# Patient Record
Sex: FEMALE | Race: WHITE | Employment: STUDENT | URBAN - METROPOLITAN AREA
[De-identification: names, ages, dates, MRNs, and addresses within clinical notes are randomized per-mention and may not be internally consistent; named-entity substitution may affect disease eponyms.]

---
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
## 2020-05-05 ENCOUNTER — TELEMEDICINE (OUTPATIENT)
Dept: INTERNAL MEDICINE CLINIC | Age: 20
End: 2020-05-05
Payer: COMMERCIAL

## 2020-05-05 ENCOUNTER — TELEPHONE (OUTPATIENT)
Dept: INTERNAL MEDICINE CLINIC | Age: 20
End: 2020-05-05

## 2020-05-05 PROBLEM — L70.9 ACNE: Status: ACTIVE | Noted: 2020-05-05

## 2020-05-05 PROBLEM — D16.9 OSTEOCHONDROMA: Status: ACTIVE | Noted: 2017-07-21

## 2020-05-05 PROBLEM — N92.0 MENORRHAGIA: Status: ACTIVE | Noted: 2017-04-11

## 2020-05-05 PROBLEM — F32.A DEPRESSIVE DISORDER: Status: ACTIVE | Noted: 2020-05-05

## 2020-05-05 PROBLEM — H52.6 DISORDER OF REFRACTION AND ACCOMMODATION: Status: ACTIVE | Noted: 2020-05-05

## 2020-05-05 PROCEDURE — 99212 OFFICE O/P EST SF 10 MIN: CPT | Performed by: INTERNAL MEDICINE

## 2020-05-05 ASSESSMENT — ENCOUNTER SYMPTOMS
VOMITING: 0
DIARRHEA: 0
BACK PAIN: 0
ABDOMINAL DISTENTION: 0
WHEEZING: 0
CONSTIPATION: 0
CHEST TIGHTNESS: 0
COUGH: 0
NAUSEA: 0
SHORTNESS OF BREATH: 0

## 2020-05-05 NOTE — PROGRESS NOTES
2020    TELEHEALTH EVALUATION -- Audio/Visual (During GNTLI-15 public health emergency)    HPI:    Jayesh Carter (:  2000) has requested an audio/video evaluation for the following concern(s):    Dysthymia and IBS    Mood improved on venlafaxine. Pt is interested in weaning off of medication as she feels her sxs are improved. Mood, energy and motivation ok. Denies si    IBS sxs improved with nortriptyline. Followed by GI    Review of Systems   Constitutional: Negative for unexpected weight change. Respiratory: Negative for cough, chest tightness, shortness of breath and wheezing. Cardiovascular: Negative for chest pain, palpitations and leg swelling. Gastrointestinal: Negative for abdominal distention, constipation, diarrhea, nausea and vomiting. Musculoskeletal: Negative for arthralgias, back pain and myalgias. Neurological: Negative for tremors and numbness. Psychiatric/Behavioral: Negative for dysphoric mood, self-injury, sleep disturbance and suicidal ideas. The patient is not nervous/anxious. All other systems reviewed and are negative. Prior to Visit Medications    Medication Sig Taking? Authorizing Provider   nortriptyline (PAMELOR) 25 MG capsule TAKE 1 CAPSULE BY MOUTH EVERY NIGHT Yes Pierre Paris MD   norgestimate-ethinyl estradiol ([address] [id number]) 0.25-35 MG-MCG per tablet TAKE 1 TABLET BY MOUTH EVERY DAY Yes Beverly Carmichael MD   venlafaxine (EFFEXOR) 37.5 MG tablet TAKE 1 TABLET BY MOUTH EVERY DAY Yes Beverly Carmichael MD   polyethylene glycol (MIRALAX) POWD powder Take 238 g by mouth daily Take as directed for colonoscopy Yes Pierre Paris MD       Social History     Tobacco Use    Smoking status: Never Smoker    Smokeless tobacco: Never Used   Substance Use Topics    Alcohol use: Yes     Comment: 7/wk    Drug use: No        No Known Allergies, History reviewed. No pertinent past medical history. ,   Social History     Tobacco Use    Smoking status: Never Smoker  Smokeless tobacco: Never Used   Substance Use Topics    Alcohol use: Yes     Comment: 7/wk    Drug use: No       PHYSICAL EXAMINATION:  [ INSTRUCTIONS:  \"[x]\" Indicates a positive item  \"[]\" Indicates a negative item  -- DELETE ALL ITEMS NOT EXAMINED]  Vital Signs: (As obtained by patient/caregiver or practitioner observation)    Constitutional: [x] Appears well-developed and well-nourished [x] No apparent distress      [] Abnormal-   Mental status  [x] Alert and awake  [x] Oriented to person/place/time []Able to follow commands      Eyes:  EOM    [x]  Normal  [] Abnormal-  Sclera  [x]  Normal  [] Abnormal -         Discharge []  None visible  [] Abnormal -    HENT:   [x] Normocephalic, atraumatic. [] Abnormal   [x] Mouth/Throat: Mucous membranes are moist.     External Ears [x] Normal  [] Abnormal-     Neck: [x] No visualized mass     Pulmonary/Chest: [x] Respiratory effort normal.  [x] No visualized signs of difficulty breathing or respiratory distress        [] Abnormal-      Musculoskeletal:         [x] Normal range of motion of neck        [] Abnormal-       Neurological:        [x] No Facial Asymmetry (Cranial nerve 7 motor function) (limited exam to video visit)          [x] No gaze palsy        [] Abnormal-   Psychiatric:       [x] Normal Affect [x] No Hallucinations         [] Abnormal-   Giuliana Medina was seen today for medication check. Diagnoses and all orders for this visit:    Dysthymia  Improved. Discussed risks of possible return of sxs with weaning off medication. Will cut tablets in half and take 1/2 daily x 30 days. Will update me on her sxs. Will call to discuss further weaning in one month    Irritable bowel syndrome with diarrhea  Improved. Continue nortriptyline        Corrine Hassan is a 21 y.o. female being evaluated by a Virtual Visit (video visit) encounter to address concerns as mentioned above. A caregiver was present when appropriate.  Due to this being a TeleHealth encounter (During MEZAB-18 public health emergency), evaluation of the following organ systems was limited: Vitals/Constitutional/EENT/Resp/CV/GI//MS/Neuro/Skin/Heme-Lymph-Imm. Pursuant to the emergency declaration under the [facility], [address] [place] [address] authority and the [employer] Act, this Virtual Visit was conducted with patient's (and/or legal guardian's) consent, to reduce the patient's risk of exposure to COVID-19 and provide necessary medical care. The patient (and/or legal guardian) has also been advised to contact this office for worsening conditions or problems, and seek emergency medical treatment and/or call 911 if deemed necessary. Patient identification was verified at the start of the visit: Yes    Total time spent on this encounter: 5-10 minutes    Services were provided through a video synchronous discussion virtually to substitute for in-person clinic visit. Patient and provider were located at their individual homes. --Piero Calvert MD on 5/5/2020 at 2:22 PM    An electronic signature was used to authenticate this note.

## 2020-07-30 RX ORDER — NORTRIPTYLINE HYDROCHLORIDE 25 MG/1
CAPSULE ORAL
Qty: 90 CAPSULE | Refills: 0 | OUTPATIENT
Start: 2020-07-30

## 2020-08-06 RX ORDER — NORTRIPTYLINE HYDROCHLORIDE 25 MG/1
CAPSULE ORAL
Qty: 90 CAPSULE | Refills: 0 | OUTPATIENT
Start: 2020-08-06

## 2020-08-06 NOTE — TELEPHONE ENCOUNTER
Patient called and states she is out of her medication and has contacted [facility] multiple times to have them send a refill request.

## 2020-08-07 RX ORDER — NORTRIPTYLINE HYDROCHLORIDE 25 MG/1
CAPSULE ORAL
Qty: 90 CAPSULE | Refills: 0 | Status: SHIPPED | OUTPATIENT
Start: 2020-08-07 | End: 2020-08-17 | Stop reason: SDUPTHER

## 2020-08-17 ENCOUNTER — VIRTUAL VISIT (OUTPATIENT)
Dept: GASTROENTEROLOGY | Age: 20
End: 2020-08-17
Payer: COMMERCIAL

## 2020-08-17 PROCEDURE — 99213 OFFICE O/P EST LOW 20 MIN: CPT | Performed by: INTERNAL MEDICINE

## 2020-08-17 RX ORDER — NORTRIPTYLINE HYDROCHLORIDE 25 MG/1
CAPSULE ORAL
Qty: 90 CAPSULE | Refills: 3 | Status: SHIPPED | OUTPATIENT
Start: 2020-08-17 | End: 2021-10-13 | Stop reason: SDUPTHER

## 2020-08-17 NOTE — PROGRESS NOTES
[address] [facility],  [address] [facility], [address] [place] [address]  Phone: [phone]   Fax:[phone]    CHIEF COMPLAINT     TELEHEALTH EVALUATION -- Audio/Visual (During PXWBU-88 public health emergency)    HPI     Thank you Lasha Lanza MD for asking me to see Teagan England in consultation. She is a Single [1] White [1] 21 y.o. Conchetta Peaks female seen independently who presents with the following GI complaints:    Teagan England (:  2000) has requested an audio/video evaluation for the following concern(s):      Chief Complaint   Patient presents with    Follow-up     needs refill of Nortriptyline 25 mg - medication pending. No new issues to discuss currently. Teagan England  Is here for routine follow up of ibs-d. Pamelor prescribed last year has resolved her dyspeptic symptoms as well as her diarrhea. She request refills. She is going back to [employer] Thursday. She is studying accounting. 5/6 classes will be online. HPI elements: location, severity, timing, modifying factors, quality, duration, context and associated signs/symptoms. Last Encounter Reviewed: 2019 Tosha Lam  Complains of 2 weeks of diarrhea 7-10/d, described as yellow without sick contacts. Now having loose yellow stools with flatulence, burping. Tried gluten free for a few days with no effect. Celiac serologies negative. Describes more chronic ibs type symptoms 1-2 times a months feeling like she needs to have a bm and severe pain followed by a loose stool improved with eventual BM 20 minutes later. Was started on pamelor 10mg a year ago which helped. Also started a probiotic a year ago. No pertinent GI family history. Put on prevacid a year ago for heartburn but symptoms recurred usually at night. Has associated nausea. Has wilma and heavy periods and does not eat much red meat.   Currently at [address] [facility] [address].       Last Encounter Reviewed:   Pertinent PMH, FH, SH is reviewed below. Last EGD/Colonoscopy: 7/12/2019 -normal esophagus, stomach, and duodenum  -normal colonic mucosa throughout. Multiple random biopsies of normal appearing colonic mucosa were obtained from the right and left colon to assess the possibility of microscopic (lymphocytic or collagenous) colitis. Biopsies were normal  -normal terminal ileum    Review of available records reveals: Wt Readings from Last 50 Encounters:   07/12/19 135 lb (61.2 kg) (63 %, Z= 0.33)*   07/08/19 139 lb (63 kg) (69 %, Z= 0.49)*   07/03/19 135 lb (61.2 kg) (63 %, Z= 0.33)*   08/09/18 134 lb (60.8 kg) (65 %, Z= 0.39)*     * Growth percentiles are based on CDC (Girls, 2-20 Years) data. No components found for: HGBA1C  BP Readings from Last 3 Encounters:   07/12/19 (!) 89/51   07/12/19 (!) 103/52   07/08/19 124/76     Health Maintenance   Topic Date Due    HIV screen  01/15/2015    Chlamydia screen  01/15/2016    Flu vaccine (1) 09/01/2020    DTaP/Tdap/Td vaccine (7 - Td) 10/03/2021    Hepatitis A vaccine  Completed    Hepatitis B vaccine  Completed    Hib vaccine  Completed    HPV vaccine  Completed    Varicella vaccine  Completed    Meningococcal (ACWY) vaccine  Completed    Pneumococcal 0-64 years Vaccine  Aged Out       No components found for: [address]   History reviewed. No pertinent past medical history.   FAMILY HISTORY     Family History   Problem Relation Age of Onset    High Blood Pressure Mother     Depression Mother     Anxiety Disorder Mother     Other Mother         factor IV    Other Father 27        MS    Inflam Bowel Dis Father     No Known Problems Brother     Other Maternal Grandmother         factor IV    Lung Cancer Maternal Grandfather     Heart Disease Maternal Grandfather     Parkinsonism Paternal Grandmother     Lung Cancer Paternal Grandfather     No Known Problems Half-Brother      SOCIAL HISTORY     Social History     Socioeconomic History    Marital status: Single     Spouse name: Not on file    Number of children: Not on file    Years of education: Not on file    Highest education level: Not on file   Occupational History    Not on file   Social Needs    Financial resource strain: Not on file    Food insecurity     Worry: Not on file     Inability: Not on file    Transportation needs     Medical: Not on file     Non-medical: Not on file   Tobacco Use    Smoking status: Never Smoker    Smokeless tobacco: Never Used   Substance and Sexual Activity    Alcohol use: Yes     Comment: 7/wk    Drug use: No    Sexual activity: Yes     Partners: Male, Female     Birth control/protection: Pill   Lifestyle    Physical activity     Days per week: Not on file     Minutes per session: Not on file    Stress: Not on file   Relationships    Social connections     Talks on phone: Not on file     Gets together: Not on file     Attends Jehovah's witness service: Not on file     Active member of club or organization: Not on file     Attends meetings of clubs or organizations: Not on file     Relationship status: Not on file    Intimate partner violence     Fear of current or ex partner: Not on file     Emotionally abused: Not on file     Physically abused: Not on file     Forced sexual activity: Not on file   Other Topics Concern    Not on file   Social History Narrative    Not on file     SURGICAL HISTORY     Past Surgical History:   Procedure Laterality Date    COLONOSCOPY N/A 7/12/2019    COLONOSCOPY WITH BIOPSY performed by Francisco Mock MD at [address] 7/12/2019    EGD DIAGNOSTIC ONLY performed by Francisco Mock MD at [address] [address]   (This list may include medications prescribed during this encounter as epic can not insert only the list prior to this encounter.)  Current Outpatient Rx   Medication Sig Dispense Refill    nortriptyline (PAMELOR) 25 MG capsule TAKE 1 CAPSULE BY MOUTH EVERY NIGHT 90 capsule 3    venlafaxine (EFFEXOR) 37.5 MG tablet TAKE 1 TABLET BY MOUTH EVERY DAY 90 tablet 1    norgestimate-ethinyl estradiol (SPRINTEC 28) 0.25-35 MG-MCG per tablet TAKE 1 TABLET BY MOUTH EVERY DAY 84 tablet 0     ALLERGIES   No Known Allergies  IMMUNIZATIONS     Immunization History   Administered Date(s) Administered    DTaP vaccine 2000, 2000, 2000, 04/06/2001, 05/11/2004    HPV Quadrivalent (Gardasil) 10/03/2011, 12/05/2011, 04/05/2012    Hepatitis A Ped/Adol (Havrix, Vaqta) 12/15/2015, 06/16/2016    Hepatitis B vaccine 2000, 2000, 04/06/2001    Hib vaccine 2000, 2000, 04/06/2001    Influenza A (F8W8-37) Vaccine IM 10/29/2009, 12/08/2009    Influenza A (C7y2-63),all Formulations 10/29/2009, 12/08/2009    Influenza Virus Vaccine 10/07/2007, 11/06/2010, 10/03/2011, 10/05/2012, 10/11/2017    Influenza, Live, Intranasal, Quadv, (Flumist 2-49 yrs) 10/25/2014, 12/15/2015    Influenza, [place] Generous, IM, PF (6 mo and older Fluzone, Flulaval, Fluarix, and 3 yrs and older Afluria) 10/11/2017, 10/26/2018, 09/05/2019    MMR 04/06/2001, 05/11/2004    Meningococcal MCV4P (Menactra) 12/15/2015, 07/21/2017    Pneumococcal Conjugate 7-valent (Prevnar7) 2000, 2000, 2000, 01/26/2001    Polio IPV (IPOL) 2000, 2000, 01/26/2001, 05/11/2004    Tdap (Boostrix, Adacel) 10/03/2011    Varicella (Varivax) 07/19/2001, 10/03/2011     REVIEW OF SYSTEMS   See HPI for further details and pertinent postiives. Negative for the following:  Constitutional: Negative for weight change. Negative for appetite change and fatigue. HENT: Negative for nosebleeds, sore throat, mouth sores, and voice change. Respiratory: Negative for cough, choking and chest tightness. Cardiovascular: Negative for chest pain   Gastrointestinal: See HPI  Musculoskeletal: Negative for arthralgias. Skin: Negative for pallor.    Neurological: Negative for weakness and light-headedness. Hematological: Negative for adenopathy. Does not bruise/bleed easily. Psychiatric/Behavioral: Negative for suicidal ideas. PHYSICAL EXAM   VITAL SIGNS: There were no vitals taken for this visit. Wt Readings from Last 3 Encounters:   07/12/19 135 lb (61.2 kg) (63 %, Z= 0.33)*   07/08/19 139 lb (63 kg) (69 %, Z= 0.49)*   07/03/19 135 lb (61.2 kg) (63 %, Z= 0.33)*     * Growth percentiles are based on CDC (Girls, 2-20 Years) data. Constitutional: Well developed, Well nourished, No acute distress, Non-toxic appearance. HENT: Normocephalic, Atraumatic, Bilateral external ears normal, Nose normal.   Eyes: Conjunctiva normal.   Neck: Normal range of motion  Thorax & Lungs: No respiratory distress  Skin: No obvious rash from the chest up  Neurologic: Alert & oriented x 3  Due to this being a TeleHealth encounter, evaluation of the following organ systems is limited: Vitals/Constitutional/EENT/Resp/CV/GI//MS/Neuro/Skin/Heme-Lymph-Imm. RADIOLOGY/PROCEDURES       FINAL IMPRESSION   No orders of the defined types were placed in this encounter. Shona Spine was seen today for follow-up. Diagnoses and all orders for this visit:    Irritable bowel syndrome with diarrhea  -     nortriptyline (PAMELOR) 25 MG capsule; TAKE 1 CAPSULE BY MOUTH EVERY NIGHT      ORDERED FUTURE/PENDING TESTS     Services were provided through a video synchronous discussion virtually with CAMILO SAHA to substitute for in-person clinic visit. Patient and provider were located at their individual homes/office. FOLLOWUP   Return in about 1 year (around 8/17/2021).           Rossy OLEARY 8/17/20 4:32 PM EDT    CC:  Tejas Hussein MD  9}  Pursuant to the emergency declaration under the [facility], [address] waiver authority and the [employer] Act, this Virtual  Visit was conducted, with patient's consent, to reduce the patient's risk of exposure to COVID-19 and provide continuity of care for an established patient. Time spent: 15 minutes    Services were provided through a video synchronous discussion virtually to substitute for in-person clinic visit.

## 2020-08-17 NOTE — LETTER
[facility] [address] [facility] ,  [address] [facility]  Phone: [phone] [facility],  [facility] [address] [facility], [address] [place] [address]  Phone: [phone]   EPN:[phone]    08/17/20    Arleth Elkins  MR Number:<[id number]>  YOB: 2000  Date of Visit:8/17/20    Dear Dr. Dedrick Lozoya MD    Thank you for the request for consultation for Oliver Ramirez to me for the evaluation of   Chief Complaint   Patient presents with    Follow-up     needs refill of Nortriptyline 25 mg - medication pending. No new issues to discuss currently. Luz Maria Lopez Below are the relevant portions of my assessment and plan of care. FINAL DIAGNOSIS/Assessment   Diagnosis Orders   1. Irritable bowel syndrome with diarrhea  nortriptyline (PAMELOR) 25 MG capsule       VISIT ORDERS/Plan  No orders of the defined types were placed in this encounter. If you have questions, please do not hesitate to call me. I look forward to following Oliver Ramirez along with you.     Sincerely,        Charline Bai 8/17/20 3:26 PM EDT

## 2020-12-28 ENCOUNTER — OFFICE VISIT (OUTPATIENT)
Dept: INTERNAL MEDICINE CLINIC | Age: 20
End: 2020-12-28
Payer: COMMERCIAL

## 2020-12-28 VITALS
DIASTOLIC BLOOD PRESSURE: 70 MMHG | TEMPERATURE: 98.2 F | OXYGEN SATURATION: 99 % | SYSTOLIC BLOOD PRESSURE: 116 MMHG | BODY MASS INDEX: 25.34 KG/M2 | WEIGHT: 157 LBS

## 2020-12-28 PROCEDURE — 99213 OFFICE O/P EST LOW 20 MIN: CPT | Performed by: INTERNAL MEDICINE

## 2020-12-28 RX ORDER — VENLAFAXINE HYDROCHLORIDE 75 MG/1
75 CAPSULE, EXTENDED RELEASE ORAL DAILY
Qty: 30 CAPSULE | Refills: 5 | Status: SHIPPED | OUTPATIENT
Start: 2020-12-28 | End: 2021-04-28

## 2020-12-28 ASSESSMENT — ENCOUNTER SYMPTOMS
DIARRHEA: 0
VOMITING: 0
CHEST TIGHTNESS: 0
BACK PAIN: 0
ABDOMINAL DISTENTION: 0
SHORTNESS OF BREATH: 0
WHEEZING: 0
COUGH: 0
CONSTIPATION: 0
NAUSEA: 0

## 2020-12-28 NOTE — PROGRESS NOTES
12/28/20    Silvia Guzmán  2000      Chief Complaint   Patient presents with    Discuss Medications       HPI    Here with c/o worsening mood. Struggling since COVID began. Has decreased energy and motivation. Sleeps a lot. Was not motivated with school which is unusual for her. Anxiety has worsened as she is afraid she will get COVID. +palpitations    Review of Systems   Constitutional: Positive for fatigue. Negative for unexpected weight change. Respiratory: Negative for cough, chest tightness, shortness of breath and wheezing. Cardiovascular: Negative for chest pain, palpitations and leg swelling. Gastrointestinal: Negative for abdominal distention, constipation, diarrhea, nausea and vomiting. Musculoskeletal: Negative for arthralgias, back pain and myalgias. Neurological: Negative for tremors and numbness. Psychiatric/Behavioral: Positive for dysphoric mood and sleep disturbance. The patient is nervous/anxious. All other systems reviewed and are negative. Current Outpatient Medications   Medication Sig Dispense Refill    venlafaxine (EFFEXOR XR) 75 MG extended release capsule Take 1 capsule by mouth daily 30 capsule 5    nortriptyline (PAMELOR) 25 MG capsule TAKE 1 CAPSULE BY MOUTH EVERY NIGHT 90 capsule 3    norgestimate-ethinyl estradiol (SPRINTEC 28) 0.25-35 MG-MCG per tablet TAKE 1 TABLET BY MOUTH EVERY DAY 84 tablet 0     No current facility-administered medications for this visit. Physical Exam  Vitals signs reviewed. Constitutional:       General: She is not in acute distress. Appearance: She is well-developed. She is not diaphoretic. HENT:      Mouth/Throat:      Pharynx: No oropharyngeal exudate. Neck:      Musculoskeletal: Neck supple. Thyroid: No thyromegaly. Cardiovascular:      Rate and Rhythm: Regular rhythm. Tachycardia present. Heart sounds: Normal heart sounds. No murmur. No friction rub. No gallop.     Pulmonary: Effort: Pulmonary effort is normal. No respiratory distress. Breath sounds: Normal breath sounds. No wheezing or rales. Abdominal:      Palpations: Abdomen is soft. Neurological:      Mental Status: She is alert and oriented to person, place, and time. Psychiatric:         Mood and Affect: Mood normal.         Behavior: Behavior normal.         Thought Content: Thought content normal.         Judgment: Judgment normal.         Vitals:    12/28/20 1303   BP: 116/70   Temp: 98.2 °F (36.8 °C)   SpO2: 99%         ASSESSMENT/PLAN:  1. Dysthymia  Worsening. Increase venlafaxine 75mg. Refer to Michelle for virtual visit    2.  Anxiety  See #1

## 2021-01-22 ENCOUNTER — VIRTUAL VISIT (OUTPATIENT)
Dept: PSYCHOLOGY | Age: 21
End: 2021-01-22
Payer: COMMERCIAL

## 2021-01-22 DIAGNOSIS — F41.9 ANXIETY: ICD-10-CM

## 2021-01-22 DIAGNOSIS — F43.23 ADJUSTMENT DISORDER WITH MIXED ANXIETY AND DEPRESSED MOOD: ICD-10-CM

## 2021-01-22 DIAGNOSIS — F33.1 MDD (MAJOR DEPRESSIVE DISORDER), RECURRENT EPISODE, MODERATE (HCC): Primary | ICD-10-CM

## 2021-01-22 PROCEDURE — 90791 PSYCH DIAGNOSTIC EVALUATION: CPT | Performed by: SOCIAL WORKER

## 2021-01-22 NOTE — PATIENT INSTRUCTIONS
[address] and [facility] [phone]  2.  Matti Adame \"the power of vulnerability\" and \"listening to shame\"  3.  youtube \"the gifts of imperfection\"  4. PMR  5. Return to see Otis Silence in 4 weeks. Progressive muscle relaxation (PMR) is an exercise that anyone can use to alleviate disturbing and disruptive emotional symptoms such as anxiety or insomnia. Like breathing exercises, visualization, and yoga, PMR is considered a relaxation technique. It's especially helpful in moments of high stress or nervousness, and even can help someone get through a panic attack. History of PMR  PMR was developed by an [place] physician, Madhav Alegria, in the 1920s. Radha Wong noted that regardless of their illness, the majority of his patients suffered from muscle pain and tension. When he suggested that they relax, he noticed that most people didn't seem connected enough to their physical tension to release it. This inspired Radha Wong to develop a sequence of steps for tightening and then relaxing groups of muscles. He found this allowed his patients to become more aware of their tension, to learn how to let go of it, and to recognize what it feels like to be in a relaxed state. Since then the technique has been modified many times but all modern variations of PMR are based on Wilsons original idea of systematically squeezing and then releasing isolated muscle groups. Does It Workand How? PMR works in part by helping to overcome a normal reaction to stress known as the flight-or-fight response. In evolutionary terms, this reaction developed as a way to help animals survive a threateither by running away or by meeting the opposition head-on. Over time the flight-or-fight response has become a common reaction to feelings of fear that often are out of proportion with reality. Breathe. Inhale deeply through your nose, feeling your abdomen rise as you fill your body with air. Then slowly exhale from your mouth, drawing your navel toward your spine. Repeat three to five times. Step 3    Starting with your feet, tighten and release your muscles. Clench your toes and pressing your heels toward the ground. Squeeze tightly for a few breaths and then release. Now flex your feet in, pointing your toes up towards your head. Hold for a few seconds and then release. Step 4    Continue to work your way up your body, tightening and releasing each muscle group. Work your way up in this order: legs, glutes, abdomen, back, hands, arms, shoulders, neck, and face. Try to tighten each muscle group for a few breaths and then slowly release. Repeat any areas that feel especially stiff. Step 5     End the practice by taking a few more deep breaths, noting how much more calm and relaxed you feel. PMR is a skill, one that takes practice to master. In order to be able to draw on PMR when you need itin other words, when you're truly in a stressful or anxiety-provoking situationyou'll want to learn how to do it while you aren't under pressure. Practice PMR several times a week to become aware of what it's like to feel relaxed. Understanding this feeling can help you to more readily let go of tension when anxiety rises.

## 2021-01-22 NOTE — PROGRESS NOTES
Behavioral Health Consultation  Brittani Jimenez. FIFI Marin  1/22/2021  2:46 PM EST      Time spent with Patient: 30 minutes  This is patient's first [facility] appointment. Reason for Consult:    Chief Complaint   Patient presents with    Anxiety    Depression     Referring Provider: Ollie Lopez MD  [address] [facility]    Pt provided informed consent for the behavioral health program. Discussed with patient model of service to include the limits of confidentiality (i.e. abuse reporting, suicide intervention, etc.) and short-term intervention focused approach. Pt indicated understanding. Feedback given to PCP. TELEHEALTH VISIT -- Audio/Visual (During OXM-46 public health emergency)  }  Pursuant to the emergency declaration under the [facility] waiver authority and the [employer] Act, this Virtual Visit was conducted, with patient's consent, to reduce the patient's risk of exposure to COVID-19 and provide continuity of care for an established patient. Services were provided through a video synchronous discussion virtually to substitute for in-person clinic visit. Pt gave verbal informed consent to participate in telehealth services. Conducted a risk-benefit analysis and determined that the patient's presenting problems are consistent with the use of telepsychology. Determined that the patient has sufficient knowledge and skills in the use of technology enabling them to adequately benefit from telepsychology. It was determined that this patient was able to be properly treated without an in-person session. Patient verified that they were currently located at the [facility] address that was provided during registration.     Verified the following information:  Patient's identification: Yes  Patient location: [address] [place] [address]   Patient's call back number: [phone] Patient's emergency contact's name and number, as well as permission to contact them if needed: Extended Emergency Contact Information  Primary Emergency Contact: Isai Alcocer. of [address] Phone: [phone]  Relation: Parent     Provider location: [place]:  Pt endorses that that she has done therapy and medication for a long time. Has struggled with depression for a long time but with covid depression has worsened and now is struggling with anxiety. Attending school at [employer], tough struggles. Pt started struggling when she was in the 1st grade, then it got worse at the age of 6. Parents are , split when she was 1 or 2. Good family, but lived between two parents. Some challenges. No SI past or present. Some etoh use, will drink on Friday and sat. Does not blackout, but will drink with friends. Some promiscuous sexual behaviors as teenager, and realizes this is something that she could work through with therapist. Has sexual relations with men way older than her. Willing to work with specialty.      O:  MSE:    Appearance: good hygiene   Attitude: cooperative and friendly  Consciousness: alert  Orientation: oriented to person, place, time, general circumstance  Memory: recent and remote memory intact  Attention/Concentration: intact during session  Psychomotor Activity:normal  Eye Contact: normal  Speech: normal rate and volume, well-articulated  Mood: anxious and depressed  Affect: euthymic  Perception: within normal limits  Thought Content: within normal limits  Thought Process: logical, coherent and goal-directed  Insight: good  Judgment: intact  Ability to understand instructions: Yes  Ability to respond meaningfully: Yes  Morbid Ideation: no   Suicide Assessment: no suicidal ideation, plan, or intent  Homicidal Ideation: no    History:    Medications:   Current Outpatient Medications   Medication Sig Dispense Refill  venlafaxine (EFFEXOR XR) 75 MG extended release capsule Take 1 capsule by mouth daily 30 capsule 5    nortriptyline (PAMELOR) 25 MG capsule TAKE 1 CAPSULE BY MOUTH EVERY NIGHT 90 capsule 3    norgestimate-ethinyl estradiol (SPRINTEC 28) 0.25-35 MG-MCG per tablet TAKE 1 TABLET BY MOUTH EVERY DAY 84 tablet 0     No current facility-administered medications for this visit. Social History:   Social History     Socioeconomic History    Marital status: Single     Spouse name: Not on file    Number of children: Not on file    Years of education: Not on file    Highest education level: Not on file   Occupational History    Not on file   Social Needs    Financial resource strain: Not on file    Food insecurity     Worry: Not on file     Inability: Not on file    Transportation needs     Medical: Not on file     Non-medical: Not on file   Tobacco Use    Smoking status: Never Smoker    Smokeless tobacco: Never Used   Substance and Sexual Activity    Alcohol use: Yes     Comment: 7/wk    Drug use: No    Sexual activity: Yes     Partners: Male, Female     Birth control/protection: Pill   Lifestyle    Physical activity     Days per week: Not on file     Minutes per session: Not on file    Stress: Not on file   Relationships    Social connections     Talks on phone: Not on file     Gets together: Not on file     Attends Scientologist service: Not on file     Active member of club or organization: Not on file     Attends meetings of clubs or organizations: Not on file     Relationship status: Not on file    Intimate partner violence     Fear of current or ex partner: Not on file     Emotionally abused: Not on file     Physically abused: Not on file     Forced sexual activity: Not on file   Other Topics Concern    Not on file   Social History Narrative    Not on file     TOBACCO:   reports that she has never smoked. She has never used smokeless tobacco.  ETOH:   reports current alcohol use.   Family History: Family History   Problem Relation Age of Onset    High Blood Pressure Mother     Depression Mother     Anxiety Disorder Mother     Other Mother         factor IV    Other Father 27        MS    Inflam Bowel Dis Father     No Known Problems Brother     Other Maternal Grandmother         factor IV    Lung Cancer Maternal Grandfather     Heart Disease Maternal Grandfather     Parkinsonism Paternal Grandmother     Lung Cancer Paternal Grandfather     No Known Problems Half-Brother        A:  Pt endorses mood is depressed and anxious but anxiety is better. Referral to specialty mental health. No SI/HI, insight and motivation good. Diagnosis:    1. MDD (major depressive disorder), recurrent episode, moderate ([facility] [address]    2. Anxiety    3. Adjustment disorder with mixed anxiety and depressed mood      No past medical history on file.      Plan:  Pt interventions:  Provided handout on  anxiety, Trained in strategies for increasing balanced thinking, Discussed and set plan for behavioral activation, Discussed self-care (sleep, nutrition, rewarding activities, social support, exercise), Discussed benefits of referral for specialty care, Motivational Interviewing to increase patient confidence and compliance with adhering to behavioral change plan, Motivational Interviewing to determine importance and readiness for change, Established rapport and Supportive techniques    Pt Behavioral Change Plan:   See Pt Instructions

## 2021-02-19 ENCOUNTER — VIRTUAL VISIT (OUTPATIENT)
Dept: PSYCHOLOGY | Age: 21
End: 2021-02-19
Payer: COMMERCIAL

## 2021-02-19 DIAGNOSIS — F41.9 ANXIETY: ICD-10-CM

## 2021-02-19 DIAGNOSIS — F33.41 RECURRENT MAJOR DEPRESSIVE DISORDER, IN PARTIAL REMISSION (HCC): Primary | ICD-10-CM

## 2021-02-19 PROCEDURE — 90832 PSYTX W PT 30 MINUTES: CPT | Performed by: SOCIAL WORKER

## 2021-02-19 NOTE — PROGRESS NOTES
Behavioral Health Consultation  Rex Dave Marin LAMONT-S  2/19/2021  2:27 PM EST      Time spent with Patient: 30 minutes  This is patient's second [facility] appointment. Reason for Consult:    Chief Complaint   Patient presents with    Anxiety    Depression     Referring Provider: Carmen Castano MD  [address] [place] [address]  Serina RogersFrank buck 19    Feedback given to PCP. TELEHEALTH VISIT -- Audio/Visual (During DBLTP-01 public health emergency)  }  Pursuant to the emergency declaration under the [facility] waiver authority and the [employer] Act, this Virtual Visit was conducted, with patient's consent, to reduce the patient's risk of exposure to COVID-19 and provide continuity of care for an established patient. Services were provided through a video synchronous discussion virtually to substitute for in-person clinic visit. Pt gave verbal informed consent to participate in telehealth services. Conducted a risk-benefit analysis and determined that the patient's presenting problems are consistent with the use of telepsychology. Determined that the patient has sufficient knowledge and skills in the use of technology enabling them to adequately benefit from telepsychology. It was determined that this patient was able to be properly treated without an in-person session. Patient verified that they were currently located at the [facility] address that was provided during registration.     Verified the following information:  Patient's identification: Yes  Patient location: [address] [place] [address] [address] [facility]   Patient's call back number: [phone]   Patient's emergency contact's name and number, as well as permission to contact them if needed: Extended Emergency Contact Information  Primary Emergency Contact: [address] of [address] [place] [address] Phone: [phone]  Relation: Parent Provider location: Rebecca Leavitt:  Pt endorses that she is doing better, haivng a good month. She had her first appt with Kiera who now takes her insurance and they had their first session. She broke up with her boyfriend and feels good about this. She watched the videos on vulernability and felt that she could not really relate. Maybe a little on shame but did not complete it. Overall improvements. O:   MSE:    Appearance: good hygiene   Attitude: cooperative and friendly  Consciousness: alert  Orientation: oriented to person, place, time, general circumstance  Memory: recent and remote memory intact  Attention/Concentration: intact during session  Psychomotor Activity:normal  Eye Contact: normal  Speech: normal rate and volume, well-articulated  Mood: euthymic  Affect: anxious  Perception: within normal limits  Thought Content: within normal limits  Thought Process: logical, coherent and goal-directed  Insight: good  Judgment: intact  Ability to understand instructions: Yes  Ability to respond meaningfully: Yes  Morbid Ideation: no   Suicide Assessment: no suicidal ideation, plan, or intent  Homicidal Ideation: no    History:    Medications:   Current Outpatient Medications   Medication Sig Dispense Refill    venlafaxine (EFFEXOR XR) 75 MG extended release capsule Take 1 capsule by mouth daily 30 capsule 5    nortriptyline (PAMELOR) 25 MG capsule TAKE 1 CAPSULE BY MOUTH EVERY NIGHT 90 capsule 3    norgestimate-ethinyl estradiol (SPRINTEC 28) 0.25-35 MG-MCG per tablet TAKE 1 TABLET BY MOUTH EVERY DAY 84 tablet 0     No current facility-administered medications for this visit.       Social History:   Social History     Socioeconomic History    Marital status: Single     Spouse name: Not on file    Number of children: Not on file    Years of education: Not on file    Highest education level: Not on file   Occupational History    Not on file   Social Needs  Financial resource strain: Not on file    Food insecurity     Worry: Not on file     Inability: Not on file   [employer] needs     Medical: Not on file     Non-medical: Not on file   Tobacco Use    Smoking status: Never Smoker    Smokeless tobacco: Never Used   Substance and Sexual Activity    Alcohol use: Yes     Comment: 7/wk    Drug use: No    Sexual activity: Yes     Partners: Male, Female     Birth control/protection: Pill   Lifestyle    Physical activity     Days per week: Not on file     Minutes per session: Not on file    Stress: Not on file   Relationships    Social connections     Talks on phone: Not on file     Gets together: Not on file     Attends Yazdanism service: Not on file     Active member of club or organization: Not on file     Attends meetings of clubs or organizations: Not on file     Relationship status: Not on file    Intimate partner violence     Fear of current or ex partner: Not on file     Emotionally abused: Not on file     Physically abused: Not on file     Forced sexual activity: Not on file   Other Topics Concern    Not on file   Social History Narrative    Not on file     TOBACCO:   reports that she has never smoked. She has never used smokeless tobacco.  ETOH:   reports current alcohol use. Family History:   Family History   Problem Relation Age of Onset    High Blood Pressure Mother     Depression Mother     Anxiety Disorder Mother     Other Mother         factor IV    Other Father 27        MS    Inflam Bowel Dis Father     No Known Problems Brother     Other Maternal Grandmother         factor IV    Lung Cancer Maternal Grandfather     Heart Disease Maternal Grandfather     Parkinsonism Paternal Grandmother     Lung Cancer Paternal Grandfather     No Known Problems Half-Brother        A:  Pt endorses mood improved. Seeing Kiera in specialty. No SI/HI, insight and motivation good.        Diagnosis: 1. Recurrent major depressive disorder, in partial remission ([facility] [address]    2. Anxiety      No past medical history on file.   Plan:  Pt interventions:  Trained in strategies for increasing balanced thinking, Discussed self-care (sleep, nutrition, rewarding activities, social support, exercise), Discussed benefits of referral for specialty care, Motivational Interviewing to increase patient confidence and compliance with adhering to behavioral change plan, Motivational Interviewing to determine importance and readiness for change and Supportive techniques    Pt Behavioral Change Plan:   See Pt Instructions

## 2021-02-19 NOTE — PATIENT INSTRUCTIONS
1.  \"the gifts of imperfection\" by Avinash Horn  2.   Reach out to me in mychart if you need anything or to schedule as needed

## 2021-04-28 RX ORDER — VENLAFAXINE HYDROCHLORIDE 75 MG/1
CAPSULE, EXTENDED RELEASE ORAL
Qty: 90 CAPSULE | Refills: 1 | Status: SHIPPED | OUTPATIENT
Start: 2021-04-28 | End: 2021-10-13

## 2021-04-28 NOTE — TELEPHONE ENCOUNTER
Refill request for effexor medication.      Name of Pharmacy- [facility]      Last visit - 12-28-20     Pending visit - none    Last refill -1-30-21      Medication Contract signed -   Last Usman Bateman ran-         Additional Comments

## 2021-10-13 DIAGNOSIS — K58.0 IRRITABLE BOWEL SYNDROME WITH DIARRHEA: ICD-10-CM

## 2021-10-13 RX ORDER — NORTRIPTYLINE HYDROCHLORIDE 25 MG/1
CAPSULE ORAL
Qty: 90 CAPSULE | Refills: 3 | Status: SHIPPED | OUTPATIENT
Start: 2021-10-13

## 2021-10-13 NOTE — TELEPHONE ENCOUNTER
Refill request for Nortripytline medication. Name of Pharmacy- [facility]      Last visit - 2-     Pending visit - N/A    Last refill - 8-      Medication Contract signed -   Last Gonzaloaarti medley-         Additional Comments     Patient is calling and needs a refill on her medicine of Venlafaxine and Nortriptyline because she will be out in 2 weeks and she lives in [place] and she will be finding a new PCP. Wants to know if she can get the 90 Day supply?          [phone]

## 2021-10-21 ENCOUNTER — TELEPHONE (OUTPATIENT)
Dept: INTERNAL MEDICINE CLINIC | Age: 21
End: 2021-10-21

## 2021-10-21 NOTE — TELEPHONE ENCOUNTER
Left 3rd voice mail asking patient to call office to discuss her options since Dr. Brianna Rueda has left the practice. Let her know we will refill her meds until 1/1/2022, however, she needs to make an appointment before we will do any further refills.

## (undated) DEVICE — 3M™ TEGADERM™ TRANSPARENT FILM DRESSING FRAME STYLE, 1624W, 2-3/8 IN X 2-3/4 IN (6 CM X 7 CM), 100/CT 4CT/CASE: Brand: 3M™ TEGADERM™

## (undated) DEVICE — CONMED SCOPE SAVER BITE BLOCK, 20X27 MM: Brand: SCOPE SAVER

## (undated) DEVICE — Z DISCONTINUED USE 2276105 GOWN PROTCT UNIV CHST W28IN L49IN SL 24IN BLU SPUNBOND FLM

## (undated) DEVICE — Device: Brand: JELCO

## (undated) DEVICE — ELECTRODE ECG MONITR FOAM TEAR DROP ADLT RED

## (undated) DEVICE — FORCEPS ENDOSCP BX L230CM DIA28MM ALGTR CUP SPEC RETRV GI

## (undated) DEVICE — AIRLIFE™ NASAL OXYGEN CANNULA CURVED, FLARED TIP, WITH 7 FEET (2.1 M) CRUSH RESISTANT TUBING, OVER-THE-EAR STYLE: Brand: AIRLIFE™

## (undated) DEVICE — SOLUTION IV 1000ML LAC RINGERS PH 6.5 INJ USP VIAFLX PLAS

## (undated) DEVICE — SET GRAV VENT NVENT CK VLV 3 NDL FREE PRT 10 GTT

## (undated) DEVICE — ENDO CARRY-ON PROCEDURE KIT INCLUDES SUCTION TUBING, LUBRICANT, GAUZE, BIOHAZARD STICKER, TRANSPORT PAD AND INTERCEPT BEDSIDE KIT.: Brand: ENDO CARRY-ON PROCEDURE KIT